# Patient Record
Sex: FEMALE | Race: WHITE | URBAN - METROPOLITAN AREA
[De-identification: names, ages, dates, MRNs, and addresses within clinical notes are randomized per-mention and may not be internally consistent; named-entity substitution may affect disease eponyms.]

---

## 2017-11-09 PROBLEM — Z91.09 ALLERGY TO ENVIRONMENTAL FACTORS: Status: ACTIVE | Noted: 2017-11-09

## 2017-11-09 PROBLEM — L20.9 ATOPIC DERMATITIS: Status: ACTIVE | Noted: 2017-11-09

## 2018-09-26 PROBLEM — R06.2 WHEEZING: Status: ACTIVE | Noted: 2018-09-26

## 2018-09-26 PROBLEM — N94.6 DYSMENORRHEA: Status: ACTIVE | Noted: 2017-09-11

## 2019-07-16 ENCOUNTER — PROCEDURE VISIT (OUTPATIENT)
Dept: PRIMARY CARE CLINIC | Age: 29
End: 2019-07-16
Payer: COMMERCIAL

## 2019-07-16 DIAGNOSIS — R06.2 WHEEZING: Primary | ICD-10-CM

## 2019-07-16 PROCEDURE — 94729 DIFFUSING CAPACITY: CPT | Performed by: FAMILY MEDICINE

## 2019-07-16 PROCEDURE — 94060 EVALUATION OF WHEEZING: CPT | Performed by: FAMILY MEDICINE

## 2019-07-16 PROCEDURE — 94727 GAS DIL/WSHOT DETER LNG VOL: CPT | Performed by: FAMILY MEDICINE

## 2019-08-07 DIAGNOSIS — R06.2 WHEEZING: ICD-10-CM

## 2020-02-18 ENCOUNTER — OFFICE VISIT (OUTPATIENT)
Dept: PRIMARY CARE CLINIC | Age: 30
End: 2020-02-18
Payer: COMMERCIAL

## 2020-02-18 VITALS
HEART RATE: 73 BPM | OXYGEN SATURATION: 98 % | HEIGHT: 59 IN | TEMPERATURE: 98.2 F | SYSTOLIC BLOOD PRESSURE: 110 MMHG | BODY MASS INDEX: 28.87 KG/M2 | DIASTOLIC BLOOD PRESSURE: 68 MMHG | WEIGHT: 143.2 LBS

## 2020-02-18 PROCEDURE — 99213 OFFICE O/P EST LOW 20 MIN: CPT | Performed by: PHYSICIAN ASSISTANT

## 2020-02-18 RX ORDER — PREDNISONE 20 MG/1
TABLET ORAL
Qty: 18 TABLET | Refills: 0 | Status: SHIPPED | OUTPATIENT
Start: 2020-02-18 | End: 2020-02-28

## 2020-02-18 RX ORDER — CALCIPOTRIENE 50 UG/G
CREAM TOPICAL
Qty: 1 TUBE | Refills: 1 | Status: SHIPPED | OUTPATIENT
Start: 2020-02-18

## 2020-02-18 RX ORDER — TRIAMCINOLONE ACETONIDE 1 MG/G
CREAM TOPICAL
Qty: 45 G | Refills: 0 | Status: SHIPPED | OUTPATIENT
Start: 2020-02-18

## 2020-02-18 ASSESSMENT — PATIENT HEALTH QUESTIONNAIRE - PHQ9
2. FEELING DOWN, DEPRESSED OR HOPELESS: 0
SUM OF ALL RESPONSES TO PHQ9 QUESTIONS 1 & 2: 0
SUM OF ALL RESPONSES TO PHQ QUESTIONS 1-9: 0
SUM OF ALL RESPONSES TO PHQ QUESTIONS 1-9: 0
1. LITTLE INTEREST OR PLEASURE IN DOING THINGS: 0

## 2020-02-18 NOTE — PROGRESS NOTES
[address] [facility] PRIMARY CARE  [address] [facility] [address]  Dept: [id number] Margarita Farfan is a 26 y.o. female who presents today for her medical conditions/complaintsas noted below. Chief Complaint   Patient presents with    Rash     Pt states bumps on neck, shoulders, and under arms. Pt stated that she started using a new deodorant x 1 week ago and that's when the bumps and itching started. Has discontinued use and tried OTC benadryl cream, cortisone cream with no relief. Pt states that her lymph nodes were swollen but that has gone down some. HPI:     HPI   Rash started between 1-2 weeks ago. After 3 days of using a new deoderant, developed new rash, but rash is not just in her axilla. Rash also on shoulders, neck, lower back, arms. 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 No other skin care product changes. If she uses lotion or body wash it is scented bath and body products. No new meds. Dyed hair, but that was about 1 month ago. No recent travel. No one else has a rash. Itchy. It keeps her up at night. Tried benadryl and cortisone creams, which only help short term. Takes generic zyrtec always- not sure if it helps itch. No results found for: LDLCHOLESTEROL, [address]    (goal LDL is <100)   No results found for: AST, ALT, BUN  BP Readings from Last 3 Encounters:   02/18/20 110/68   09/26/18 108/72   07/30/18 112/76          (goal 120/80)    Past Medical History:   Diagnosis Date    Allergic rhinitis       No past surgical history on file.     Family History   Problem Relation Age of Onset    Heart Disease Mother     High Blood Pressure Mother     Cancer Maternal Aunt         Bone    Heart Disease Maternal Grandmother     High Blood Pressure Maternal Grandmother     Heart Disease Maternal Grandfather     High Blood Pressure Maternal Grandfather     Heart Attack Maternal sounds. Skin:            Comments: Erythematous papules on left arm, across lower back, on right posterior shoulder   Neurological:      Mental Status: She is alert. Assessment:       Diagnosis Orders   1. Dermatitis  triamcinolone (KENALOG) 0.1 % cream    predniSONE (DELTASONE) 20 MG tablet   2. Other psoriasis  triamcinolone (KENALOG) 0.1 % cream    calcipotriene (DOVONEX) 0.005 % cream        Plan:    1. Dermatitis- Switch to unscented skin products like cetaphil/cerave instead of [employer] products. Rx for triamcinolone cream and 9 day prednisone taper. 2. Chronic rash on posterior neck looks like it could be psoriasis. Use triamcinolone cream in AM and dovonex in PM for this. Return if symptoms worsen or fail to improve. No orders of the defined types were placed in this encounter. Orders Placed This Encounter   Medications    triamcinolone (KENALOG) 0.1 % cream     Sig: Apply topically 2 times daily. Dispense:  45 g     Refill:  0    predniSONE (DELTASONE) 20 MG tablet     Sig: 3 tabs x 3 days, then 2 tabs x 3 days, then 1 tab x 3 days     Dispense:  18 tablet     Refill:  0    calcipotriene (DOVONEX) 0.005 % cream     Sig: Apply topically 2 times daily. Dispense:  1 Tube     Refill:  1       Patient given educationalmaterials - see patient instructions. Discussed use, benefit, and side effectsof prescribed medications. All patient questions answered. Pt voiced understanding. Reviewed health maintenance. Instructed to continue current medications, diet andexercise. Patient agreed with treatment plan. Follow up as directed.      Electronicallysigned by Gal Lamar PA-C on 2/24/2020 at 11:17 PM